# Patient Record
Sex: FEMALE | Race: WHITE | Employment: FULL TIME | URBAN - METROPOLITAN AREA
[De-identification: names, ages, dates, MRNs, and addresses within clinical notes are randomized per-mention and may not be internally consistent; named-entity substitution may affect disease eponyms.]

---

## 2018-02-05 ENCOUNTER — HOSPITAL ENCOUNTER (OUTPATIENT)
Dept: PHYSICAL THERAPY | Age: 23
Discharge: HOME | End: 2018-02-05
Payer: COMMERCIAL

## 2018-02-05 PROCEDURE — 97162 PT EVAL MOD COMPLEX 30 MIN: CPT

## 2018-02-05 PROCEDURE — 97014 ELECTRIC STIMULATION THERAPY: CPT

## 2018-02-05 PROCEDURE — 97110 THERAPEUTIC EXERCISES: CPT

## 2018-02-05 NOTE — THERAPY EVALUATION
Hoda Osorio  : 1995 [facility] [address]  [address] [address].  [phone]   [phone]        OUTPATIENT PHYSICAL THERAPY:Initial Assessment 2018        ICD-10: Treatment Diagnosis: Cervicalgia (M54.2)  Precautions/Allergies:   Amoxicillin and Sulfa (sulfonamide antibiotics)   Fall Risk Score: 0 (? 5 = High Risk)  MD Orders: Eval and Treat  MEDICAL/REFERRING DIAGNOSIS:  Neck pain [M54.2]  Shoulder pain [M25.519]   DATE OF ONSET: about a week ago   REFERRING PHYSICIAN: Shanique Ramos NP  RETURN PHYSICIAN APPOINTMENT: tbd      INITIAL ASSESSMENT:  Ms. Car Conner presents to therapy with pain in the neck and upper traps due to posture and possible muscle strain. Pt is having tenderness over the C7 junction and would benefit from postural education and manual treatment as well as exercises and stretching to work on strengthening. Pt is having decreased cervical rotation, decreased cervical extensor strength and difficulty with prolonged sitting and standing, and difficulty with functional activities due to pain in the neck. PROBLEM LIST (Impacting functional limitations):  1. Decreased Strength  2. Decreased Transfer Abilities  3. Increased Pain  4. Decreased Activity Tolerance  5. Decreased Flexibility/Joint Mobility INTERVENTIONS PLANNED:  1. Cold  2. Electrical Stimulation  3. Heat  4. Home Exercise Program (HEP)  5. Manual Therapy  6. Therapeutic Exercise/Strengthening  7. Ultrasound (US)   TREATMENT PLAN:  Effective Dates: 18 TO 3-5-18. Frequency/Duration: 2 times a week for 4 weeks  GOALS: (Goals have been discussed and agreed upon with patient.)  Short-Term Functional Goals: Time Frame: 2 weeks   1. Ms. Car Conner to be independent with HEP. 2. Pt is able to sit through class without pain in the neck and shoulders 100% of time. Discharge Goals: Time Frame: 4 weeks          1.   Pt to have no pain at night and no difficulty with sleeping. 2. Pt able to return to prior level of function painfree. Rehabilitation Potential For Stated Goals: Excellent  Regarding Reza Tang Ronny's therapy, I certify that the treatment plan above will be carried out by a therapist or under their direction. Thank you for this referral,  Tara Owen, PT, DPT       Referring Physician Signature: Brett Macdonald NP              Date                      HISTORY:   History of Present Injury/Illness (Reason for Referral):   Pt c/o pain in the neck that started about a week ago and has progressively gotten worse but recently the pain is out of the shoulder blade and now more in the base of the neck and into the head. No c/o headaches but pt does wake up with tenderness over the spine. Pt stated she tries to take Tylenol but she forgets sometimes and she has trouble falling asleep due to the discomfort. The pain is worse with stress and throughout the day. Pt stated the pain is worse with sitting on a hard surface and with studying and stress. Past Medical History/Comorbidities:   Ms. Krystyna Badillo  has no past medical history on file. Ms. Krystyna Badillo  has no past surgical history on file. Social History/Living Environment:     Lives on campus   Prior Level of Function/Work/Activity:  Sophomore majoring in [employer]   Dominant Side:         RIGHT  Current Medications:    Current Outpatient Prescriptions:     OTHER, Birth control, Disp: , Rfl:     ondansetron (ZOFRAN ODT) 4 mg disintegrating tablet, Take 1 Tab by mouth every eight (8) hours as needed for Nausea., Disp: 11 Tab, Rfl: 1   Date Last Reviewed:  2/5/2018   # of Personal Factors/Comorbidities that affect the Plan of Care: 1-2: MODERATE COMPLEXITY   EXAMINATION:   Observation/Orthostatic Postural Assessment:          Good   Palpation:          Tenderness over the C7T1 junction.  No tightness in the upper traps  ROM:     Full AROM in all directions with only slight pain in the C7 junction with rotations bilaterally       Strength:     Weakness and pain with the cervical extension resistance       Special Tests:          Compression (-), Spurlings R and L (-), Full AROM in the shoulders   Neurological Screen:        Sensation: no complaint of numbness or tingling           Balance:          Good    Body Structures Involved:  1. Joints  2. Muscles Body Functions Affected:  1. Movement Related Activities and Participation Affected:  1. Mobility   # of elements that affect the Plan of Care: 3: MODERATE COMPLEXITY   CLINICAL PRESENTATION:   Presentation: Evolving clinical presentation with changing clinical characteristics: MODERATE COMPLEXITY   CLINICAL DECISION MAKING:   Outcome Measure: Tool Used: Neck Disability Index (NDI)  Score:  Initial: 9/50  Most Recent: X/50 (Date: -- )   Interpretation of Score: The Neck Disability Index is a revised form of the Oswestry Low Back Pain Index and is designed to measure the activities of daily living in person's with neck pain. Each section is scored on a 0-5 scale, 5 representing the greatest disability. The scores of each section are added together for a total score of 50. Score 0 1-10 11-20 21-30 31-40 41-49 50   Modifier CH CI CJ CK CL CM CN     Medical Necessity:   · Patient is expected to demonstrate progress in strength and range of motion to increase independence with functional activities painfree. .  Reason for Services/Other Comments:  · Patient continues to require present interventions due to patient's inability to perform functional activities painfree. .   Use of outcome tool(s) and clinical judgement create a POC that gives a: Questionable prediction of patient's progress: MODERATE COMPLEXITY   TREATMENT:   (In addition to Assessment/Re-Assessment sessions the following treatments were rendered)  THERAPEUTIC EXERCISE: (see chart below for  minutes):  Exercises per grid below to improve mobility and strength.   Required minimal verbal cues to promote proper body alignment, promote proper body posture and promote proper body mechanics. Progressed resistance as indicated. MANUAL THERAPY: (see chart below  minutes): Joint mobilization and Soft tissue mobilization was utilized and necessary because of the patient's restricted joint motion, loss of articular motion and restricted motion of soft tissue. MODALITIES: (see chart below for  minutes):      see chart below for details on pain management. Date: 2-5-18       Modalities: 15 mins        IFC wth HP  15 mins supine                        Therapeutic Exercise: 15 mins        Cervical retraction supine  x20       Cervical retraction seated  x20       Levator stretch  4x10SH       Cat/camel  x20        scap retraction  x20        Cervical rotation stretch with towel  4x10SH bilateral        Proprioceptive Activities:                                Manual Therapy:                        Therapeutic Activities:                                        HEP: Pt was given HEP and educated on all exercises and agreed to understanding. [facility] Portal  Treatment/Session Assessment:  Pt would benefit from mobilizations at the C7-T1 junction as well as stretching and postural education. Pt was given HEP and told to get a HP for home. Pt would benefit from [facility] and cervical extensor strengthening as well as modalities for pain relief. · Pain/ Symptoms: Initial:   4/10 \"Its gotten a little better but im . \"  Post Session:  No increase in pain following session. ·   Compliance with Program/Exercises: Will assess as treatment progresses. · Recommendations/Intent for next treatment session: \"Next visit will focus on advancements to more challenging activities\".   Total Treatment Duration:  PT Patient Time In/Time Out  Time In: 0245  Time Out: [address], PT, DPT

## 2018-02-05 NOTE — PROGRESS NOTES
Ambulatory/Rehab Services H2 Model Falls Risk Assessment    Risk Factor Pts. ·   Confusion/Disorientation/Impulsivity  []    4 ·   Symptomatic Depression  []   2 ·   Altered Elimination  []   1 ·   Dizziness/Vertigo  []   1 ·   Gender (Male)  []   1 ·   Any administered antiepileptics (anticonvulsants):  []   2 ·   Any administered benzodiazepines:  []   1 ·   Visual Impairment (specify):  []   1 ·   Portable Oxygen Use  []   1 ·   Orthostatic ? BP  []   1 ·   History of Recent Falls (within 3 mos.)  []   5     Ability to Rise from Chair (choose one) Pts. ·   Ability to rise in a single movement  [x]   0 ·   Pushes up, successful in one attempt  []   1 ·   Multiple attempts, but successful  []   3 ·   Unable to rise without assistance  []   4   Total: (5 or greater = High Risk) 0     Falls Prevention Plan:   []                Physical Limitations to Exercise (specify):   []                Mobility Assistance Device (type):   []                Exercise/Equipment Adaptation (specify):    ©2010 [facility] [place] Patent #[id number].  Federal Law prohibits the replication, distribution or use without written permission from [facility] [employer]

## 2018-02-07 ENCOUNTER — HOSPITAL ENCOUNTER (OUTPATIENT)
Dept: PHYSICAL THERAPY | Age: 23
Discharge: HOME | End: 2018-02-07
Payer: COMMERCIAL

## 2018-02-07 PROCEDURE — 97110 THERAPEUTIC EXERCISES: CPT

## 2018-02-07 PROCEDURE — 97140 MANUAL THERAPY 1/> REGIONS: CPT

## 2018-02-07 PROCEDURE — 97014 ELECTRIC STIMULATION THERAPY: CPT

## 2018-02-07 NOTE — PROGRESS NOTES
Kareem Priest  : 1995 [facility] [address]  [facility]  [phone]   [phone]        OUTPATIENT PHYSICAL THERAPY:Daily Note    2018        ICD-10: Treatment Diagnosis: Cervicalgia (M54.2)  Precautions/Allergies:   Amoxicillin and Sulfa (sulfonamide antibiotics)   Fall Risk Score: 0 (? 5 = High Risk)  MD Orders: Eval and Treat  MEDICAL/REFERRING DIAGNOSIS:  Neck pain [M54.2]  Shoulder pain [M25.519]   DATE OF ONSET: about a week ago   REFERRING PHYSICIAN: Kristin Solitario NP  RETURN PHYSICIAN APPOINTMENT: tbd      INITIAL ASSESSMENT:  Ms. Richie Gregorio presents to therapy with pain in the neck and upper traps due to posture and possible muscle strain. Pt is having tenderness over the C7 junction and would benefit from postural education and manual treatment as well as exercises and stretching to work on strengthening. Pt is having decreased cervical rotation, decreased cervical extensor strength and difficulty with prolonged sitting and standing, and difficulty with functional activities due to pain in the neck. PROBLEM LIST (Impacting functional limitations):  1. Decreased Strength  2. Decreased Transfer Abilities  3. Increased Pain  4. Decreased Activity Tolerance  5. Decreased Flexibility/Joint Mobility INTERVENTIONS PLANNED:  1. Cold  2. Electrical Stimulation  3. Heat  4. Home Exercise Program (HEP)  5. Manual Therapy  6. Therapeutic Exercise/Strengthening  7. Ultrasound (US)   TREATMENT PLAN:  Effective Dates: 18 TO 3-5-18. Frequency/Duration: 2 times a week for 4 weeks  GOALS: (Goals have been discussed and agreed upon with patient.)  Short-Term Functional Goals: Time Frame: 2 weeks   1. Ms. Richie Gregorio to be independent with HEP. 2. Pt is able to sit through class without pain in the neck and shoulders 100% of time. Discharge Goals: Time Frame: 4 weeks          1.   Pt to have no pain at night and no difficulty with sleeping. 2. Pt able to return to prior level of function painfree. Rehabilitation Potential For Stated Goals: Excellent                HISTORY:   History of Present Injury/Illness (Reason for Referral):   Pt c/o pain in the neck that started about a week ago and has progressively gotten worse but recently the pain is out of the shoulder blade and now more in the base of the neck and into the head. No c/o headaches but pt does wake up with tenderness over the spine. Pt stated she tries to take Tylenol but she forgets sometimes and she has trouble falling asleep due to the discomfort. The pain is worse with stress and throughout the day. Pt stated the pain is worse with sitting on a hard surface and with studying and stress. Past Medical History/Comorbidities:   Ms. Patel Shoulder  has no past medical history on file. Ms. Patel Shoulder  has no past surgical history on file. Social History/Living Environment:     Lives on campus   Prior Level of Function/Work/Activity:  Sophomore majoring in [employer]   Dominant Side:         RIGHT  Current Medications:    Current Outpatient Prescriptions:     OTHER, Birth control, Disp: , Rfl:     ondansetron (ZOFRAN ODT) 4 mg disintegrating tablet, Take 1 Tab by mouth every eight (8) hours as needed for Nausea., Disp: 11 Tab, Rfl: 1   Date Last Reviewed:  2/7/2018   EXAMINATION:   Observation/Orthostatic Postural Assessment:          Good   Palpation:          Tenderness over the C7T1 junction. No tightness in the upper traps  ROM:     Full AROM in all directions with only slight pain in the C7 junction with rotations bilaterally       Strength:     Weakness and pain with the cervical extension resistance       Special Tests:          Compression (-), Spurlings R and L (-), Full AROM in the shoulders   Neurological Screen:        Sensation: no complaint of numbness or tingling           Balance:          Good    Body Structures Involved:  1. Joints  2.  Muscles Body Functions Affected:  1. Movement Related Activities and Participation Affected:  1. Mobility   CLINICAL PRESENTATION:   CLINICAL DECISION MAKING:   Outcome Measure: Tool Used: Neck Disability Index (NDI)  Score:  Initial: 9/50  Most Recent: X/50 (Date: -- )   Interpretation of Score: The Neck Disability Index is a revised form of the Oswestry Low Back Pain Index and is designed to measure the activities of daily living in person's with neck pain. Each section is scored on a 0-5 scale, 5 representing the greatest disability. The scores of each section are added together for a total score of 50. Score 0 1-10 11-20 21-30 31-40 41-49 50   Modifier CH CI CJ CK CL CM CN     Medical Necessity:   · Patient is expected to demonstrate progress in strength and range of motion to increase independence with functional activities painfree. .  Reason for Services/Other Comments:  · Patient continues to require present interventions due to patient's inability to perform functional activities painfree. .   TREATMENT:   (In addition to Assessment/Re-Assessment sessions the following treatments were rendered)  THERAPEUTIC EXERCISE: (see chart below for  minutes):  Exercises per grid below to improve mobility and strength. Required minimal verbal cues to promote proper body alignment, promote proper body posture and promote proper body mechanics. Progressed resistance as indicated. MANUAL THERAPY: (see chart below  minutes): Joint mobilization and Soft tissue mobilization was utilized and necessary because of the patient's restricted joint motion, loss of articular motion and restricted motion of soft tissue. MODALITIES: (see chart below for  minutes):      see chart below for details on pain management.       Date: 2-5-18 2-7-18  (Visit 2)      Modalities: 15 mins  15 mins       IFC wth HP  15 mins supine  15 mins prior to tx                      Therapeutic Exercise: 15 mins  15 mins       Cervical retraction supine  x20 Cervical retraction seated  x20       Levator stretch  4x10SH       Cat/camel  x20        scap retraction  x20  Repeat       SL thoracic rotation   2x10 bilateral       Cervical rotation stretch with towel  4x10SH bilateral  Repeat       Proprioceptive Activities:                                Manual Therapy:  20 mins       STM cervical and thoracic   15 mins       PAs C5-T5  x30 each       Therapeutic Activities:                                        HEP: Pt was given HEP and educated on all exercises and agreed to understanding. [facility] Portal  Treatment/Session Assessment:  Pt was a little sore following treatment today but was encouraged to stretch and to work on HEP as well as get a HP to loosen the muscle and take something for pain if needed. · Pain/ Symptoms: Initial:   2/10 \"Im good this morning but I just got up. \"  Post Session:  No increase in pain following session. ·   Compliance with Program/Exercises: Will assess as treatment progresses. · Recommendations/Intent for next treatment session: \"Next visit will focus on advancements to more challenging activities\".   Total Treatment Duration:  PT Patient Time In/Time Out  Time In: 0930  Time Out: Marianne Huerta, PT, DPT

## 2018-02-09 ENCOUNTER — HOSPITAL ENCOUNTER (OUTPATIENT)
Dept: PHYSICAL THERAPY | Age: 23
Discharge: HOME | End: 2018-02-09
Payer: COMMERCIAL

## 2018-02-14 ENCOUNTER — HOSPITAL ENCOUNTER (OUTPATIENT)
Dept: PHYSICAL THERAPY | Age: 23
Discharge: HOME | End: 2018-02-14
Payer: COMMERCIAL

## 2018-02-14 PROCEDURE — 97140 MANUAL THERAPY 1/> REGIONS: CPT

## 2018-02-14 PROCEDURE — 97014 ELECTRIC STIMULATION THERAPY: CPT

## 2018-02-14 NOTE — PROGRESS NOTES
Anca Vaughn  : 1995 [facility] [address]  [facility]0 [facility], [address] [place] [address]  [phone]   [phone]        OUTPATIENT PHYSICAL THERAPY:Daily Note    2/15/2018        ICD-10: Treatment Diagnosis: Cervicalgia (M54.2)  Precautions/Allergies:   Amoxicillin and Sulfa (sulfonamide antibiotics)   Fall Risk Score: 0 (? 5 = High Risk)  MD Orders: Eval and Treat  MEDICAL/REFERRING DIAGNOSIS:  Neck pain [M54.2]  Shoulder pain [M25.519]   DATE OF ONSET: about a week ago   REFERRING PHYSICIAN: Stephany Vargas NP  RETURN PHYSICIAN APPOINTMENT: tbd      INITIAL ASSESSMENT:  Ms. Sarita Altamirano presents to therapy with pain in the neck and upper traps due to posture and possible muscle strain. Pt is having tenderness over the C7 junction and would benefit from postural education and manual treatment as well as exercises and stretching to work on strengthening. Pt is having decreased cervical rotation, decreased cervical extensor strength and difficulty with prolonged sitting and standing, and difficulty with functional activities due to pain in the neck. PROBLEM LIST (Impacting functional limitations):  1. Decreased Strength  2. Decreased Transfer Abilities  3. Increased Pain  4. Decreased Activity Tolerance  5. Decreased Flexibility/Joint Mobility INTERVENTIONS PLANNED:  1. Cold  2. Electrical Stimulation  3. Heat  4. Home Exercise Program (HEP)  5. Manual Therapy  6. Therapeutic Exercise/Strengthening  7. Ultrasound (US)   TREATMENT PLAN:  Effective Dates: 18 TO 3-5-18. Frequency/Duration: 2 times a week for 4 weeks  GOALS: (Goals have been discussed and agreed upon with patient.)  Short-Term Functional Goals: Time Frame: 2 weeks   1. Ms. Sarita Altamirano to be independent with HEP. 2. Pt is able to sit through class without pain in the neck and shoulders 100% of time. Discharge Goals: Time Frame: 4 weeks          1.   Pt to have no pain at night and no difficulty with sleeping. 2. Pt able to return to prior level of function painfree. Rehabilitation Potential For Stated Goals: Excellent                HISTORY:   History of Present Injury/Illness (Reason for Referral):   Pt c/o pain in the neck that started about a week ago and has progressively gotten worse but recently the pain is out of the shoulder blade and now more in the base of the neck and into the head. No c/o headaches but pt does wake up with tenderness over the spine. Pt stated she tries to take Tylenol but she forgets sometimes and she has trouble falling asleep due to the discomfort. The pain is worse with stress and throughout the day. Pt stated the pain is worse with sitting on a hard surface and with studying and stress. Past Medical History/Comorbidities:   Ms. Katharina Fountain  has no past medical history on file. Ms. Katharina Fountain  has no past surgical history on file. Social History/Living Environment:     Lives on campus   Prior Level of Function/Work/Activity:  Sophomore majoring in [employer]   Dominant Side:         RIGHT  Current Medications:    Current Outpatient Prescriptions:     OTHER, Birth control, Disp: , Rfl:     ondansetron (ZOFRAN ODT) 4 mg disintegrating tablet, Take 1 Tab by mouth every eight (8) hours as needed for Nausea., Disp: 11 Tab, Rfl: 1   Date Last Reviewed:  2/14/2018   EXAMINATION:   Observation/Orthostatic Postural Assessment:          Good   Palpation:          Tenderness over the C7T1 junction. No tightness in the upper traps  ROM:     Full AROM in all directions with only slight pain in the C7 junction with rotations bilaterally       Strength:     Weakness and pain with the cervical extension resistance       Special Tests:          Compression (-), Spurlings R and L (-), Full AROM in the shoulders   Neurological Screen:        Sensation: no complaint of numbness or tingling           Balance:          Good    Body Structures Involved:  1. Joints  2.  Muscles Body Functions Affected:  1. Movement Related Activities and Participation Affected:  1. Mobility   CLINICAL PRESENTATION:   CLINICAL DECISION MAKING:   Outcome Measure: Tool Used: Neck Disability Index (NDI)  Score:  Initial: 9/50  Most Recent: X/50 (Date: -- )   Interpretation of Score: The Neck Disability Index is a revised form of the Oswestry Low Back Pain Index and is designed to measure the activities of daily living in person's with neck pain. Each section is scored on a 0-5 scale, 5 representing the greatest disability. The scores of each section are added together for a total score of 50. Score 0 1-10 11-20 21-30 31-40 41-49 50   Modifier CH CI CJ CK CL CM CN     Medical Necessity:   · Patient is expected to demonstrate progress in strength and range of motion to increase independence with functional activities painfree. .  Reason for Services/Other Comments:  · Patient continues to require present interventions due to patient's inability to perform functional activities painfree. .   TREATMENT:   (In addition to Assessment/Re-Assessment sessions the following treatments were rendered)  THERAPEUTIC EXERCISE: (see chart below for  minutes):  Exercises per grid below to improve mobility and strength. Required minimal verbal cues to promote proper body alignment, promote proper body posture and promote proper body mechanics. Progressed resistance as indicated. MANUAL THERAPY: (see chart below  minutes): Joint mobilization and Soft tissue mobilization was utilized and necessary because of the patient's restricted joint motion, loss of articular motion and restricted motion of soft tissue. MODALITIES: (see chart below for  minutes):      see chart below for details on pain management.       Date: 2-5-18 2-7-18  (Visit 2) 2-14-18  (Visit 3)      Modalities: 15 mins  15 mins  15 mins      IFC wt HP  15 mins supine  15 mins prior to tx 15 mins                      Therapeutic Exercise: 15 mins  15 mins       Cervical retraction supine  x20       Cervical retraction seated  x20       Levator stretch  4x10SH       Cat/camel  x20        scap retraction  x20  Repeat       Oleg pose    With rotation 2x10SH     SL thoracic rotation   2x10 bilateral  Repeat      Cervical rotation stretch with towel  4x10SH bilateral  Repeat  Repeat      Proprioceptive Activities:                                Manual Therapy:  20 mins  25 mins      STM cervical and thoracic   15 mins  Repeat      PAs C5-T5  x30 each  Repeat      Therapeutic Activities:                                        HEP: Pt was given HEP and educated on all exercises and agreed to understanding. [facility] Portal  Treatment/Session Assessment:  Continue     · Pain/ Symptoms: Initial:   0/10 Its just a little tight today but im feeling better. \"  Post Session:  No increase in pain following session. ·   Compliance with Program/Exercises: Will assess as treatment progresses. · Recommendations/Intent for next treatment session: \"Next visit will focus on advancements to more challenging activities\".   Total Treatment Duration:  PT Patient Time In/Time Out  Time In: 9025  Time Out: 50 Elliot Bernal, PT, DPT

## 2018-02-16 ENCOUNTER — HOSPITAL ENCOUNTER (OUTPATIENT)
Dept: PHYSICAL THERAPY | Age: 23
Discharge: HOME | End: 2018-02-16
Payer: COMMERCIAL

## 2018-02-16 PROCEDURE — 97014 ELECTRIC STIMULATION THERAPY: CPT

## 2018-02-16 PROCEDURE — 97110 THERAPEUTIC EXERCISES: CPT

## 2018-02-16 PROCEDURE — 97140 MANUAL THERAPY 1/> REGIONS: CPT

## 2018-02-19 ENCOUNTER — HOSPITAL ENCOUNTER (OUTPATIENT)
Dept: PHYSICAL THERAPY | Age: 23
Discharge: HOME | End: 2018-02-19
Payer: COMMERCIAL

## 2018-02-19 ENCOUNTER — APPOINTMENT (OUTPATIENT)
Dept: PHYSICAL THERAPY | Age: 23
End: 2018-02-19
Payer: COMMERCIAL

## 2018-02-21 ENCOUNTER — APPOINTMENT (OUTPATIENT)
Dept: PHYSICAL THERAPY | Age: 23
End: 2018-02-21
Payer: COMMERCIAL

## 2018-02-23 ENCOUNTER — APPOINTMENT (OUTPATIENT)
Dept: PHYSICAL THERAPY | Age: 23
End: 2018-02-23
Payer: COMMERCIAL

## 2018-02-26 ENCOUNTER — APPOINTMENT (OUTPATIENT)
Dept: PHYSICAL THERAPY | Age: 23
End: 2018-02-26
Payer: COMMERCIAL

## 2018-02-28 ENCOUNTER — APPOINTMENT (OUTPATIENT)
Dept: PHYSICAL THERAPY | Age: 23
End: 2018-02-28
Payer: COMMERCIAL

## 2018-03-02 ENCOUNTER — APPOINTMENT (OUTPATIENT)
Dept: PHYSICAL THERAPY | Age: 23
End: 2018-03-02

## 2018-04-09 NOTE — PROGRESS NOTES
Issa Jing  : 1995 [id number] [address].  [phone]   [phone]        OUTPATIENT PHYSICAL THERAPY:Discontinuation Summary    2018        ICD-10: Treatment Diagnosis: Cervicalgia (M54.2)  Precautions/Allergies:   Amoxicillin and Sulfa (sulfonamide antibiotics)   Fall Risk Score: 0 (? 5 = High Risk)  MD Orders: Eval and Treat  MEDICAL/REFERRING DIAGNOSIS:  Neck pain [M54.2]  Shoulder pain [M25.519]   DATE OF ONSET: about a week ago   REFERRING PHYSICIAN: Jennifer Brownlee NP  RETURN PHYSICIAN APPOINTMENT: tbd      INITIAL ASSESSMENT:  Ms. Abbie Nunez presents to therapy with pain in the neck and upper traps due to posture and possible muscle strain. Pt is having tenderness over the C7 junction and would benefit from postural education and manual treatment as well as exercises and stretching to work on strengthening. Pt is having decreased cervical rotation, decreased cervical extensor strength and difficulty with prolonged sitting and standing, and difficulty with functional activities due to pain in the neck. PROBLEM LIST (Impacting functional limitations):  1. Decreased Strength  2. Decreased Transfer Abilities  3. Increased Pain  4. Decreased Activity Tolerance  5. Decreased Flexibility/Joint Mobility INTERVENTIONS PLANNED:  1. Cold  2. Electrical Stimulation  3. Heat  4. Home Exercise Program (HEP)  5. Manual Therapy  6. Therapeutic Exercise/Strengthening  7. Ultrasound (US)   TREATMENT PLAN:  Effective Dates: 18 TO 3-5-18. Frequency/Duration: 2 times a week for 4 weeks  GOALS: (Goals have been discussed and agreed upon with patient.)  Short-Term Functional Goals: Time Frame: 2 weeks   1. Ms. Abbie Nunez to be independent with HEP. 2. Pt is able to sit through class without pain in the neck and shoulders 100% of time. Discharge Goals: Time Frame: 4 weeks          1.   Pt to have no pain at night and no difficulty with sleeping. 2. Pt able to return to prior level of function painfree. Rehabilitation Potential For Stated Goals: Excellent                HISTORY:   History of Present Injury/Illness (Reason for Referral):   Pt c/o pain in the neck that started about a week ago and has progressively gotten worse but recently the pain is out of the shoulder blade and now more in the base of the neck and into the head. No c/o headaches but pt does wake up with tenderness over the spine. Pt stated she tries to take Tylenol but she forgets sometimes and she has trouble falling asleep due to the discomfort. The pain is worse with stress and throughout the day. Pt stated the pain is worse with sitting on a hard surface and with studying and stress. Past Medical History/Comorbidities:   Ms. Christopher Jorge  has no past medical history on file. Ms. Christopher Jorge  has no past surgical history on file. Social History/Living Environment:     Lives on campus   Prior Level of Function/Work/Activity:  Sophomore majoring in [employer]   Dominant Side:         RIGHT  Current Medications:    Current Outpatient Prescriptions:     OTHER, Birth control, Disp: , Rfl:     ondansetron (ZOFRAN ODT) 4 mg disintegrating tablet, Take 1 Tab by mouth every eight (8) hours as needed for Nausea., Disp: 11 Tab, Rfl: 1   Date Last Reviewed:  2/16/2018   EXAMINATION:   Observation/Orthostatic Postural Assessment:          Good   Palpation:          Tenderness over the C7T1 junction. No tightness in the upper traps  ROM:     Full AROM in all directions with only slight pain in the C7 junction with rotations bilaterally       Strength:     Weakness and pain with the cervical extension resistance       Special Tests:          Compression (-), Spurlings R and L (-), Full AROM in the shoulders   Neurological Screen:        Sensation: no complaint of numbness or tingling           Balance:          Good    Body Structures Involved:  1. Joints  2.  Muscles Body Functions Affected:  1. Movement Related Activities and Participation Affected:  1. Mobility   CLINICAL PRESENTATION:   CLINICAL DECISION MAKING:   Outcome Measure: Tool Used: Neck Disability Index (NDI)  Score:  Initial: 9/50  Most Recent: X/50 (Date: -- )   Interpretation of Score: The Neck Disability Index is a revised form of the Oswestry Low Back Pain Index and is designed to measure the activities of daily living in person's with neck pain. Each section is scored on a 0-5 scale, 5 representing the greatest disability. The scores of each section are added together for a total score of 50. Score 0 1-10 11-20 21-30 31-40 41-49 50   Modifier CH CI CJ CK CL CM CN     Medical Necessity:   · Patient is expected to demonstrate progress in strength and range of motion to increase independence with functional activities painfree. .  Reason for Services/Other Comments:  · Patient continues to require present interventions due to patient's inability to perform functional activities painfree. .   TREATMENT:   (In addition to Assessment/Re-Assessment sessions the following treatments were rendered)  THERAPEUTIC EXERCISE: (see chart below for  minutes):  Exercises per grid below to improve mobility and strength. Required minimal verbal cues to promote proper body alignment, promote proper body posture and promote proper body mechanics. Progressed resistance as indicated. MANUAL THERAPY: (see chart below  minutes): Joint mobilization and Soft tissue mobilization was utilized and necessary because of the patient's restricted joint motion, loss of articular motion and restricted motion of soft tissue. MODALITIES: (see chart below for  minutes):      see chart below for details on pain management.       Date: 2-5-18 2-7-18  (Visit 2) 2-16-18 (visit 3)     Modalities: 15 mins  15 mins  15 min     IFC wth HP  15 mins supine  15 mins prior to tx 15 min post tx                     Therapeutic Exercise: 15 mins  15 mins  25 min     Cervical retraction supine  x20  x15     Cervical retraction seated  x20       Prone scapula retraction and cervical extension   x10     Corner stretch   15 sec hold x3     Levator stretch  4x10SH  10 SH X6     Cat/camel  x20   X10 ea direction     Shoulder extension   3x10 blue     scap retraction  x20  Repeat  3x10 black band     SL thoracic rotation   2x10 bilateral  Thread the needle stretch 10 sec hold x 6 B     UBE   L2 5 min reverse     Cervical rotation stretch with towel  4x10SH bilateral  Repeat       Proprioceptive Activities:                                Manual Therapy:  20 mins  20 min     STM cervical and thoracic   15 mins  Cervical distraction, PROM and occipital release     PAs C5-T5  x30 each       Therapeutic Activities:                                        HEP: Pt was given HEP and educated on all exercises and agreed to understanding. [facility]  Treatment/Session Assessment:  Pt did not return to therapy. Pt is discontinued.       Ashly Castelan, PT, DPT

## 2022-02-07 ENCOUNTER — HOSPITAL ENCOUNTER (EMERGENCY)
Age: 27
Discharge: HOME | End: 2022-02-07
Attending: EMERGENCY MEDICINE
Payer: COMMERCIAL

## 2022-02-07 ENCOUNTER — APPOINTMENT (OUTPATIENT)
Dept: GENERAL RADIOLOGY | Age: 27
End: 2022-02-07
Attending: EMERGENCY MEDICINE
Payer: COMMERCIAL

## 2022-02-07 VITALS
HEART RATE: 117 BPM | BODY MASS INDEX: 20.4 KG/M2 | SYSTOLIC BLOOD PRESSURE: 135 MMHG | HEIGHT: 67 IN | DIASTOLIC BLOOD PRESSURE: 78 MMHG | RESPIRATION RATE: 16 BRPM | OXYGEN SATURATION: 100 % | TEMPERATURE: 98.6 F | WEIGHT: 130 LBS

## 2022-02-07 DIAGNOSIS — R68.84 JAW PAIN: Primary | ICD-10-CM

## 2022-02-07 PROCEDURE — 99281 EMR DPT VST MAYX REQ PHY/QHP: CPT

## 2022-02-07 PROCEDURE — 99282 EMERGENCY DEPT VISIT SF MDM: CPT

## 2022-02-07 PROCEDURE — 70110 X-RAY EXAM OF JAW 4/> VIEWS: CPT

## 2022-02-07 NOTE — ED TRIAGE NOTES
Pt arrives ambulatory to triage. Pt states some jaw pain on the right side. Pt states she was having pain in it last night. Intermittent ROM. Pt states she feels like her jaw is out of line and feels some grinding. Teeth appear aligned in triage. Pt states she thinks it may have popped back in but still having pain. NAD.

## 2022-02-07 NOTE — ED PROVIDER NOTES
Patient is a 24-year-old female with history of Juan-Danlos syndrome who presents with right-sided jaw pain. She said she was opening her mouth and putting lotion on her face when it felt like her jaw dislocated and then went back into place. She is scared to open it wide. She says she has had subluxations to other joints in the past but not to her jaw. No past medical history on file. No past surgical history on file. No family history on file. Social History     Socioeconomic History    Marital status: SINGLE     Spouse name: Not on file    Number of children: Not on file    Years of education: Not on file    Highest education level: Not on file   Occupational History    Not on file   Tobacco Use    Smoking status: Not on file    Smokeless tobacco: Not on file   Substance and Sexual Activity    Alcohol use: Not on file    Drug use: Not on file    Sexual activity: Not on file   Other Topics Concern    Not on file   Social History Narrative    Not on file     Social Determinants of Health     Financial Resource Strain:     Difficulty of Paying Living Expenses: Not on file   Food Insecurity:     Worried About Running Out of Food in the Last Year: Not on file    Jeronimo of Food in the Last Year: Not on file   Transportation Needs:     Lack of Transportation (Medical): Not on file    Lack of Transportation (Non-Medical):  Not on file   Physical Activity:     Days of Exercise per Week: Not on file    Minutes of Exercise per Session: Not on file   Stress:     Feeling of Stress : Not on file   Social Connections:     Frequency of Communication with Friends and Family: Not on file    Frequency of Social Gatherings with Friends and Family: Not on file    Attends Bahai Services: Not on file    Active Member of Clubs or Organizations: Not on file    Attends Club or Organization Meetings: Not on file    Marital Status: Not on file   Intimate Partner Violence:     Fear of Current or Ex-Partner: Not on file    Emotionally Abused: Not on file    Physically Abused: Not on file    Sexually Abused: Not on file   Housing Stability:     Unable to Pay for Housing in the Last Year: Not on file    Number of Places Lived in the Last Year: Not on file    Unstable Housing in the Last Year: Not on file         ALLERGIES: Amoxicillin and Sulfa (sulfonamide antibiotics)    Review of Systems   Constitutional: Negative. HENT:        Jaw pain   Respiratory: Negative. Cardiovascular: Negative. Gastrointestinal: Negative. Genitourinary: Negative. All other systems reviewed and are negative. Vitals:    02/07/22 1441   BP: 135/78   Pulse: (!) 117   Resp: 16   Temp: 98.6 °F (37 °C)   SpO2: 100%   Weight: 59 kg (130 lb)   Height: 5' 7\" (1.702 m)            Physical Exam  Vitals and nursing note reviewed. Constitutional:       General: She is not in acute distress. Appearance: Normal appearance. She is normal weight. She is not ill-appearing or toxic-appearing. HENT:      Head: Normocephalic. Mouth/Throat:      Mouth: Mucous membranes are moist.      Comments: Patient can open and close mouth without difficulty. She can talk without difficulty. Nontender over TMJ bilaterally or angle of mandible. No evidence of jaw dislocation on exam.  Cardiovascular:      Rate and Rhythm: Normal rate and regular rhythm. Pulmonary:      Effort: Pulmonary effort is normal.      Breath sounds: Normal breath sounds. Musculoskeletal:         General: Normal range of motion. Cervical back: Normal range of motion. Skin:     General: Skin is warm and dry. Findings: No rash. Neurological:      General: No focal deficit present. Mental Status: She is alert and oriented to person, place, and time. Mental status is at baseline. Motor: No weakness.       Gait: Gait normal.   Psychiatric:         Mood and Affect: Mood normal.         Behavior: Behavior normal. Thought Content: Thought content normal.      Comments: Anxious          MDM  Number of Diagnoses or Management Options  Diagnosis management comments: Patient presents with concern for jaw subluxation/dislocation. On exam her jaw is aligned and not dislocated. She can open and close without difficulty. I recommended she start with liquid diet and slowly progress. Tylenol or Motrin as needed for pain. Return if worsening in any way.     Risk of Complications, Morbidity, and/or Mortality  Presenting problems: low  Diagnostic procedures: low  Management options: low    Patient Progress  Patient progress: stable         Procedures